# Patient Record
Sex: MALE | Race: ASIAN | NOT HISPANIC OR LATINO | URBAN - METROPOLITAN AREA
[De-identification: names, ages, dates, MRNs, and addresses within clinical notes are randomized per-mention and may not be internally consistent; named-entity substitution may affect disease eponyms.]

---

## 2018-06-05 ENCOUNTER — EMERGENCY (EMERGENCY)
Facility: HOSPITAL | Age: 38
LOS: 1 days | Discharge: HOME | End: 2018-06-05
Attending: EMERGENCY MEDICINE | Admitting: EMERGENCY MEDICINE
Payer: MEDICAID

## 2018-06-05 VITALS
TEMPERATURE: 97 F | RESPIRATION RATE: 16 BRPM | HEART RATE: 97 BPM | SYSTOLIC BLOOD PRESSURE: 151 MMHG | DIASTOLIC BLOOD PRESSURE: 64 MMHG

## 2018-06-05 VITALS
TEMPERATURE: 99 F | RESPIRATION RATE: 18 BRPM | HEART RATE: 103 BPM | SYSTOLIC BLOOD PRESSURE: 161 MMHG | DIASTOLIC BLOOD PRESSURE: 100 MMHG | OXYGEN SATURATION: 99 %

## 2018-06-05 LAB
ALBUMIN SERPL ELPH-MCNC: 4.4 G/DL — SIGNIFICANT CHANGE UP (ref 3.3–5)
ALP SERPL-CCNC: 91 U/L — SIGNIFICANT CHANGE UP (ref 40–120)
ALT FLD-CCNC: 47 U/L — HIGH (ref 4–41)
AST SERPL-CCNC: 36 U/L — SIGNIFICANT CHANGE UP (ref 4–40)
BILIRUB SERPL-MCNC: 0.5 MG/DL — SIGNIFICANT CHANGE UP (ref 0.2–1.2)
BUN SERPL-MCNC: 12 MG/DL — SIGNIFICANT CHANGE UP (ref 7–23)
CALCIUM SERPL-MCNC: 9 MG/DL — SIGNIFICANT CHANGE UP (ref 8.4–10.5)
CHLORIDE SERPL-SCNC: 100 MMOL/L — SIGNIFICANT CHANGE UP (ref 98–107)
CO2 SERPL-SCNC: 22 MMOL/L — SIGNIFICANT CHANGE UP (ref 22–31)
CREAT SERPL-MCNC: 0.89 MG/DL — SIGNIFICANT CHANGE UP (ref 0.5–1.3)
GLUCOSE SERPL-MCNC: 96 MG/DL — SIGNIFICANT CHANGE UP (ref 70–99)
POTASSIUM SERPL-MCNC: 4.7 MMOL/L — SIGNIFICANT CHANGE UP (ref 3.5–5.3)
POTASSIUM SERPL-SCNC: 4.7 MMOL/L — SIGNIFICANT CHANGE UP (ref 3.5–5.3)
PROT SERPL-MCNC: 8.8 G/DL — HIGH (ref 6–8.3)
SODIUM SERPL-SCNC: 137 MMOL/L — SIGNIFICANT CHANGE UP (ref 135–145)
TROPONIN T SERPL-MCNC: < 0.06 NG/ML — SIGNIFICANT CHANGE UP (ref 0–0.06)

## 2018-06-05 PROCEDURE — 99285 EMERGENCY DEPT VISIT HI MDM: CPT

## 2018-06-05 PROCEDURE — 70450 CT HEAD/BRAIN W/O DYE: CPT | Mod: 26,59

## 2018-06-05 PROCEDURE — 70496 CT ANGIOGRAPHY HEAD: CPT | Mod: 26

## 2018-06-05 PROCEDURE — 70498 CT ANGIOGRAPHY NECK: CPT | Mod: 26

## 2018-06-05 NOTE — ED PROVIDER NOTE - PROGRESS NOTE DETAILS
Dr. Emery: Was called for stroke eval. Pt states he woke up at 4:30am and noticed some left sided facial numbness. Pt denies any difficulty speaking or swallowing. Pt denies any numbness to UE or LE. Pt admits to slight left sided headache. Denies any vision changes, fever, chills, nausea, vomiting, SOB, chest pain, or abd pain. Pt admits to having similar symptoms 2 wks ago that resolved on their own. No facial droop. 5/5 b/l UE and LE. Ambulating without any difficulty. CT ordered. Dr. Emery: Was called for stroke eval. Pt states he woke up at 4:30am and noticed some left sided facial numbness. Pt denies any difficulty speaking or swallowing. Pt denies any numbness to UE or LE. Pt admits to slight left sided headache. Denies any vision changes, fever, chills, nausea, vomiting, SOB, chest pain, or abd pain. Pt admits to having similar symptoms 2 wks ago that resolved on their own. No facial droop. Mild reported decreased sensation on left face compared to right. 5/5 b/l UE and LE. Ambulating without any difficulty. CT ordered. pending neurology call back neurology recommend ct angio head/neck neurology states now endorsing a headache. likely a complex headache. neurology recommend ct angio head/neck in ED. MIKE w neurology regarding results. plan dc home with outpt followup. MIKE w neurology neg ct angio head/neck. Copies given to pt and referral list provided. Recommend oupt neurology appt and see pmd for recheck BP. pt Ao3  deidre w plan. neurology  consult appreciated.

## 2018-06-05 NOTE — ED ADULT TRIAGE NOTE - CHIEF COMPLAINT QUOTE
L sided facial numbness that began 2 weeks ago but self resolved. pt awoke at 430 am feeling L sided facial numbness and L arm numbness. no facial droop no slurred speech no arm drift or  weakness.    Dr Emery called for FIT eval no code stroke called.

## 2018-06-05 NOTE — CONSULT NOTE ADULT - SUBJECTIVE AND OBJECTIVE BOX
Neurology Consult Note    Name  VICKY SELBY    HPI:  Patient is a 37 year old Male w/ PMHx of HTN, presents to ED w/ numbness of the left side of face and left arm numbness since 4:30 AM.  He reports that he went to bed at 1:30 AM, "was fine" woke up at 4:30 AM feeling "funny, numbness of left side face/eye/left arm".  Had similar episodes approximately 3 months ago and on May 20th with numbness of left side face/arm, which resolved.   He reports that the episodes are preceded with a headache which starts on the superior aspect of his head and radiates down the L side of his head to his LUE.  He reports that the symptoms are similar to the ones in the past, however less intense than previously.  Patient denies any nausea/vomiting, visual changes, weakness, or sensory changes.       Subjective:    Review of Systems:  Constitutional:        Eyes, Ears, Mouth, Throat:   Respiratory:                            Cardiovascular:   Gastrointestinal:                                     Genitourinary:   Musculoskeletal:                                    Dermatologic:   Neurological: as per above                                                                 Psychiatric:   Endocrine:              Hematologic/Lymphatic:     MEDICATIONS  (STANDING):    MEDICATIONS  (PRN):      Allergies    No Known Allergies    Intolerances      PAST MEDICAL & SURGICAL HISTORY:    FH:  SH:    Objective:   Vital Signs Last 24 Hrs  T(C): 37.2 (05 Jun 2018 09:29), Max: 37.2 (05 Jun 2018 09:29)  T(F): 99 (05 Jun 2018 09:29), Max: 99 (05 Jun 2018 09:29)  HR: 103 (05 Jun 2018 09:29) (103 - 103)  BP: 161/100 (05 Jun 2018 09:29) (161/100 - 161/100)  BP(mean): --  RR: 18 (05 Jun 2018 09:29) (18 - 18)  SpO2: 99% (05 Jun 2018 09:29) (99% - 99%)    General Exam:   General appearance: No acute distress                 General Adult Exam    Neurological Exam:  Mental Status: Orientated to self, date and place.  Attention intact.  No dysarthria, aphasia or neglect.  Knowledge intact.  Registration intact.  Short and long term memory grossly intact.      Cranial Nerves: CN I - not tested.  PERRL, EOMI, VFF, no nystagmus or diplopia.  No APD.  Fundi not visualized bilaterally.  CN V1-3 intact to light touch and pinprick.  No facial asymmetry.  Hearing intact to finger rub bilaterally.  Tongue, uvula and palate midline.  Sternocleidomastoid and Trapezius intact bilaterally.    Motor:   Tone: normal.                  Strength:     [] Upper extremity                      Delt       Bicep    Tricep                                                  R         5/5 5/5 5/5 5/5                                               L          5/5 5/5 5/5 5/5  [] Lower extremity                       HF          KE          KF        DF         PF                                               R        5/5 5/5 5/5 5/5 5/5                                               L         5/5 5/5 5/5 5/5 5/5  Pronator drift: none                 Dysmetria: None to finger-nose-finger or heel-shin-heel  No truncal ataxia.    Tremor: No resting, postural or action tremor.  No myoclonus.    Sensation: intact to light touch, subjectively reporting decreased, however reports equal b/l    Deep Tendon Reflexes: 1+ bilateral biceps, triceps, brachioradialis, knee and ankle  Toes flexor bilaterally    Gait: normal and stable.        Other:        Radiology

## 2018-06-05 NOTE — ED PROVIDER NOTE - PHYSICAL EXAMINATION
nontoxic appearing. no facial droop. no slurred speech. AOx3, no focal deficits. CN 2-12 grossly intact. nl gait. no meningeal signs. neg pronator drift. nl finger to nose. nl tandem gait. nl strength of bl arms/LEgs.  Decrease sensation of the left side of face and left arm/hand. nl sensation bl lower ext.

## 2018-06-05 NOTE — CONSULT NOTE ADULT - ASSESSMENT
37 year old Male w/ PMHx of HTN, presents to ED w/ numbness of the left side of face and left arm numbness since 4:30 AM, associated with preceding headache, similar to episodes in the past.  Neurological exam non-focal.  CTH pending.  Symptoms and presentation consistent with migraine w/o aura.  In setting of patient with significant family Hx of CAD, would obtain pertinent labs.    Plan:  - HbA1c, lipid profile for baseline evaluation of DM/HLD  - f/u CTH, if no acute abnormality, patient may followup with outpatient neurology for further assessment 37 year old Male w/ PMHx of HTN, presents to ED w/ numbness of the left side of face and left arm numbness since 4:30 AM, associated with preceding headache, similar to episodes in the past.  Neurological exam non-focal.  CTH pending.  Symptoms and presentation consistent with migraine w/o aura.  In setting of patient with significant family Hx of CAD, would obtain pertinent labs.  NIHSS 0, MRS 0    Plan:  - CTA head/neck for baseline evaluation of vessels  - HbA1c, lipid profile for baseline evaluation of DM/HLD  - f/u imaging, if no acute abnormality, patient may followup with outpatient neurology for further assessment

## 2018-06-05 NOTE — ED PROVIDER NOTE - OBJECTIVE STATEMENT
36 y/o male with PMHx of Dr Orellana  38yo M hx of HTN, not on AC, presents to ED co numbness of the left side of face and left arm numbness since 430am. PT went to bed at 130am, "was fine" woke up at 430am feeling "funny, numbness of left side face/eye/left arm" no chest pain. no slurred speech. no diplopia no blurred vision. no sob. no weakness. no headache. no neck pain. no trauma. Had a similar symptom on May 20th with numbness of left side face/arm, self resolved. pt states he is adherent to meds. Social ETOH, last drink was three days ago.

## 2019-03-25 NOTE — ED PROVIDER NOTE - DATE/TIME 1
I sent a new medication.  He will take metoprolol 25 xl once a day instead of cutting the other tablet twice a day.  Joel Santacruz MD  Family Medicine     05-Jun-2018 09:32